# Patient Record
Sex: FEMALE | URBAN - METROPOLITAN AREA
[De-identification: names, ages, dates, MRNs, and addresses within clinical notes are randomized per-mention and may not be internally consistent; named-entity substitution may affect disease eponyms.]

---

## 2019-01-07 ENCOUNTER — HOSPITAL ENCOUNTER (OUTPATIENT)
Dept: LAB | Age: 49
Discharge: HOME | End: 2019-01-07

## 2019-01-07 PROCEDURE — 88305 TISSUE EXAM BY PATHOLOGIST: CPT

## 2019-01-07 PROCEDURE — 88312 SPECIAL STAINS GROUP 1: CPT

## 2023-08-28 PROBLEM — Z87.42 HISTORY OF PCOS: Status: ACTIVE | Noted: 2023-08-28

## 2023-08-28 PROBLEM — I25.42 SPONTANEOUS DISSECTION OF CORONARY ARTERY: Status: ACTIVE | Noted: 2023-08-28

## 2023-08-28 PROBLEM — M35.9 CONNECTIVE TISSUE DISORDER (MULTI): Status: ACTIVE | Noted: 2023-08-28

## 2023-08-28 PROBLEM — G43.909 MIGRAINES: Status: ACTIVE | Noted: 2023-08-28

## 2023-08-28 PROBLEM — E03.9 HYPOTHYROIDISM: Status: ACTIVE | Noted: 2023-08-28

## 2023-08-28 RX ORDER — CARVEDILOL 3.12 MG/1
1 TABLET ORAL 2 TIMES DAILY
COMMUNITY
Start: 2018-06-25 | End: 2024-04-04 | Stop reason: SDUPTHER

## 2023-08-28 RX ORDER — CYCLOBENZAPRINE HCL 5 MG
TABLET ORAL
COMMUNITY

## 2023-08-28 RX ORDER — HYDROXYZINE HYDROCHLORIDE 10 MG/1
TABLET, FILM COATED ORAL
COMMUNITY

## 2023-08-28 RX ORDER — ASPIRIN 81 MG/1
1 TABLET ORAL EVERY OTHER DAY
COMMUNITY
Start: 2019-06-11

## 2023-08-28 RX ORDER — LORATADINE 10 MG/1
1 CAPSULE, LIQUID FILLED ORAL 2 TIMES DAILY PRN
COMMUNITY
Start: 2019-06-11

## 2023-08-28 RX ORDER — BUTALBITAL, ACETAMINOPHEN, CAFFEINE AND CODEINE PHOSPHATE 300; 50; 40; 30 MG/1; MG/1; MG/1; MG/1
1 CAPSULE ORAL EVERY 6 HOURS PRN
COMMUNITY
Start: 2019-06-11

## 2023-08-28 RX ORDER — ALUMINUM ZIRCONIUM OCTACHLOROHYDREX GLY 16 G/100G
1 GEL TOPICAL EVERY MORNING
COMMUNITY
Start: 2019-06-11

## 2023-08-28 RX ORDER — THYROID 90 MG/1
1 TABLET ORAL
COMMUNITY
Start: 2021-09-20

## 2024-04-04 ENCOUNTER — HOSPITAL ENCOUNTER (OUTPATIENT)
Dept: CARDIOLOGY | Facility: HOSPITAL | Age: 54
Discharge: HOME | End: 2024-04-04
Payer: COMMERCIAL

## 2024-04-04 ENCOUNTER — LAB (OUTPATIENT)
Dept: LAB | Facility: LAB | Age: 54
End: 2024-04-04
Payer: COMMERCIAL

## 2024-04-04 ENCOUNTER — OFFICE VISIT (OUTPATIENT)
Dept: CARDIOLOGY | Facility: HOSPITAL | Age: 54
End: 2024-04-04
Payer: COMMERCIAL

## 2024-04-04 VITALS
WEIGHT: 220.6 LBS | SYSTOLIC BLOOD PRESSURE: 141 MMHG | BODY MASS INDEX: 34.62 KG/M2 | DIASTOLIC BLOOD PRESSURE: 88 MMHG | HEIGHT: 67 IN | HEART RATE: 85 BPM | OXYGEN SATURATION: 96 %

## 2024-04-04 DIAGNOSIS — Z00.00 HEALTHCARE MAINTENANCE: ICD-10-CM

## 2024-04-04 DIAGNOSIS — I10 PRIMARY HYPERTENSION: ICD-10-CM

## 2024-04-04 DIAGNOSIS — I25.42 SPONTANEOUS DISSECTION OF CORONARY ARTERY: ICD-10-CM

## 2024-04-04 DIAGNOSIS — E66.9 OBESITY (BMI 30.0-34.9): Primary | ICD-10-CM

## 2024-04-04 PROBLEM — R87.619 ABNORMAL CERVICAL CYTOLOGY: Status: ACTIVE | Noted: 2024-04-04

## 2024-04-04 PROBLEM — I51.89 DIASTOLIC DYSFUNCTION: Status: ACTIVE | Noted: 2021-01-27

## 2024-04-04 PROBLEM — K90.41 GLUTEN INTOLERANCE: Status: ACTIVE | Noted: 2024-04-04

## 2024-04-04 PROBLEM — Z98.890 HISTORY OF REPAIR OF ACL: Status: ACTIVE | Noted: 2023-12-11

## 2024-04-04 PROBLEM — E78.5 HYPERLIPIDEMIA: Status: ACTIVE | Noted: 2017-02-20

## 2024-04-04 PROBLEM — M62.81 MUSCLE WEAKNESS (GENERALIZED): Status: ACTIVE | Noted: 2024-04-04

## 2024-04-04 PROBLEM — Q89.1 ADRENAL GLAND ANOMALY: Status: ACTIVE | Noted: 2024-04-04

## 2024-04-04 PROBLEM — M25.562 ACUTE PAIN OF LEFT KNEE: Status: ACTIVE | Noted: 2023-06-15

## 2024-04-04 PROBLEM — I34.0 MITRAL VALVE REGURGITATION: Status: ACTIVE | Noted: 2021-01-27

## 2024-04-04 PROBLEM — I21.9 ACUTE MI (MULTI): Status: ACTIVE | Noted: 2017-02-20

## 2024-04-04 PROBLEM — E28.2 PCOS (POLYCYSTIC OVARIAN SYNDROME): Status: ACTIVE | Noted: 2021-01-27

## 2024-04-04 PROBLEM — M25.572 ACUTE LEFT ANKLE PAIN: Status: ACTIVE | Noted: 2023-06-15

## 2024-04-04 PROBLEM — M25.662 DECREASED RANGE OF MOTION OF LEFT KNEE: Status: ACTIVE | Noted: 2024-04-04

## 2024-04-04 PROBLEM — B35.4 TINEA CORPORIS: Status: ACTIVE | Noted: 2017-06-28

## 2024-04-04 PROBLEM — S83.512A LEFT ANTERIOR CRUCIATE LIGAMENT TEAR: Status: ACTIVE | Noted: 2023-10-31

## 2024-04-04 LAB
ALBUMIN SERPL BCP-MCNC: 4.2 G/DL (ref 3.4–5)
ALP SERPL-CCNC: 64 U/L (ref 33–110)
ALT SERPL W P-5'-P-CCNC: 30 U/L (ref 7–45)
ANION GAP SERPL CALC-SCNC: 11 MMOL/L (ref 10–20)
AST SERPL W P-5'-P-CCNC: 17 U/L (ref 9–39)
BILIRUB SERPL-MCNC: 0.4 MG/DL (ref 0–1.2)
BUN SERPL-MCNC: 25 MG/DL (ref 6–23)
CALCIUM SERPL-MCNC: 9.3 MG/DL (ref 8.6–10.6)
CHLORIDE SERPL-SCNC: 106 MMOL/L (ref 98–107)
CO2 SERPL-SCNC: 24 MMOL/L (ref 21–32)
CREAT SERPL-MCNC: 0.74 MG/DL (ref 0.5–1.05)
EGFRCR SERPLBLD CKD-EPI 2021: >90 ML/MIN/1.73M*2
ERYTHROCYTE [DISTWIDTH] IN BLOOD BY AUTOMATED COUNT: 12.4 % (ref 11.5–14.5)
EST. AVERAGE GLUCOSE BLD GHB EST-MCNC: 114 MG/DL
GLUCOSE SERPL-MCNC: 115 MG/DL (ref 74–99)
HBA1C MFR BLD: 5.6 %
HCT VFR BLD AUTO: 42.1 % (ref 36–46)
HGB BLD-MCNC: 13.9 G/DL (ref 12–16)
MCH RBC QN AUTO: 29.1 PG (ref 26–34)
MCHC RBC AUTO-ENTMCNC: 33 G/DL (ref 32–36)
MCV RBC AUTO: 88 FL (ref 80–100)
NRBC BLD-RTO: 0 /100 WBCS (ref 0–0)
PLATELET # BLD AUTO: 280 X10*3/UL (ref 150–450)
POTASSIUM SERPL-SCNC: 4.4 MMOL/L (ref 3.5–5.3)
PROT SERPL-MCNC: 6.7 G/DL (ref 6.4–8.2)
RBC # BLD AUTO: 4.78 X10*6/UL (ref 4–5.2)
SODIUM SERPL-SCNC: 137 MMOL/L (ref 136–145)
WBC # BLD AUTO: 5 X10*3/UL (ref 4.4–11.3)

## 2024-04-04 PROCEDURE — 83036 HEMOGLOBIN GLYCOSYLATED A1C: CPT

## 2024-04-04 PROCEDURE — 36415 COLL VENOUS BLD VENIPUNCTURE: CPT

## 2024-04-04 PROCEDURE — 99214 OFFICE O/P EST MOD 30 MIN: CPT | Performed by: INTERNAL MEDICINE

## 2024-04-04 PROCEDURE — 3079F DIAST BP 80-89 MM HG: CPT | Performed by: INTERNAL MEDICINE

## 2024-04-04 PROCEDURE — 1036F TOBACCO NON-USER: CPT | Performed by: INTERNAL MEDICINE

## 2024-04-04 PROCEDURE — 93005 ELECTROCARDIOGRAM TRACING: CPT

## 2024-04-04 PROCEDURE — 85027 COMPLETE CBC AUTOMATED: CPT

## 2024-04-04 PROCEDURE — 80053 COMPREHEN METABOLIC PANEL: CPT

## 2024-04-04 PROCEDURE — 3077F SYST BP >= 140 MM HG: CPT | Performed by: INTERNAL MEDICINE

## 2024-04-04 PROCEDURE — 93010 ELECTROCARDIOGRAM REPORT: CPT | Performed by: INTERNAL MEDICINE

## 2024-04-04 RX ORDER — CARVEDILOL 6.25 MG/1
6.25 TABLET ORAL 2 TIMES DAILY
Qty: 180 TABLET | Refills: 3 | Status: SHIPPED | OUTPATIENT
Start: 2024-04-04 | End: 2025-04-04

## 2024-04-04 ASSESSMENT — PAIN SCALES - GENERAL: PAINLEVEL: 0-NO PAIN

## 2024-04-04 NOTE — PATIENT INSTRUCTIONS
Increase Carvedilol to 6.25mg twice daily.  Ordered blood work for you today.    See you back in 1 year.    Beverly Mcgrath MD  Co-Director, Vascular Center  [facility] [place], [facility]   [facility] Family Master Clinician in Fibromuscular Dysplasia and Vascular Care  Professor of Medicine  [facility]

## 2024-04-04 NOTE — LETTER
April 4, 2024     Mt Rosado Jr., MD  [address] [facility], [address] [place] [address]    Patient: Bambi Garner   YOB: 1970   Date of Visit: 4/4/2024       Dear Dr. Mt Rosado Jr., MD:    Thank you for referring Bambi Garner to me for evaluation. Below are my notes for this consultation.  If you have questions, please do not hesitate to call me. I look forward to following your patient along with you.       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 She underwent pan vascular testing and be ruled out fibromuscular dysplasia or other extra coronary vascular abnormalities. 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      She has not had cardiac events since I saw her last year. She had knee surgery in November (left ACL reconstruction); it's been a slow recovery.    She has regained ~ 12# since I saw her last year.    No chest pain, dyspnea, palpitations; some high BP readings including around time of surgery.    Patient Active Problem List   Diagnosis   • Connective tissue disorder (CMS/HCC)   • Hypothyroidism   • Migraines   • Spontaneous dissection of coronary artery   • History of PCOS   • Abnormal cervical cytology   • Acute left ankle pain   • Acute MI (CMS/HCC)   • Acute pain of left knee   • Adrenal gland anomaly   • Cholelithiasis   • Decreased range of motion of left knee   • Depression   • Diastolic dysfunction   • Gastroesophageal reflux disease   • Gluten intolerance   • History of repair of ACL   •  "Hyperlipidemia   • Hypertension   • Left anterior cruciate ligament tear   • Mitral valve regurgitation   • Muscle weakness (generalized)   • PCOS (polycystic ovarian syndrome)   • Tinea corporis     Current Outpatient Medications   Medication Sig Dispense Refill   • aspirin 81 mg EC tablet Take 1 tablet (81 mg) by mouth every other day.     • Bifidobacterium infantis (Align) 4 mg capsule Take 1 capsule (4 mg) by mouth once daily in the morning.     • butalbitaL-acetaminop-caf-cod (Fioricet with Codeine) -10-30 mg capsule Take 1 capsule by mouth every 6 hours if needed.     • carvedilol (Coreg) 6.25 mg tablet Take 1 tablet (6.25 mg) by mouth 2 times a day. 180 tablet 3   • cyclobenzaprine (Flexeril) 5 mg tablet Take by mouth.     • fish oil concentrate (Omega-3) 120-180 mg capsule Take by mouth.     • hydrOXYzine HCL (Atarax) 10 mg tablet Take by mouth.     • loratadine (Claritin Liqui-Gel) 10 mg capsule Take 1 tablet by mouth 2 times a day as needed.     • multivitamin with folic acid (THERA ORAL) Take by mouth.     • thyroid, pork, ([place] Thyroid) 90 mg tablet Take 1 tablet (90 mg) by mouth once daily in the morning. Take before meals. On empty stomach       No current facility-administered medications for this visit.     On examination:  Patient Vitals for the past 24 hrs:   BP Pulse SpO2 Height Weight   04/04/24 1112 141/88 85 96 % 1.702 m (5' 7\") 100 kg (220 lb 9.6 oz)    Weight is up 12# since last year and ~35# since 2017  HEENT no Chato's  JVP flat  +S1, S2, RRR; no m/r/g  Clear lungs  Abd soft, benign  Brisk LE pulses    ECG today NSR at 83 beats per minute. Normal intervals/axis, slightly low voltage QRS    10.2022 head to pelvis CTA  ? Very slight ICA Beading, but I really thought likely normal  Some aortic tortuosity  No aortic, visceral branch, or IC aneurysms  No renal/mesenteric/iliac FMD, ? Slight ectasia reported but I was not compelled    Results/Data  I am not impressed by ? of ICA " beading, splenic/right renal ectasias, likely variants of normal. Multiple thyroid nodules.      Assessment/Plan:     Terrific 54-year-old woman who had a spontaneous coronary artery dissection in 2017 involving an OM branch. She is clinically doing well with no recurrent events. 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     From SCAD perspective, she is doing fine, no recurrent events in ~7 years. She does need sustained B Blockade to prevent recurrent SCAD events. BP a little high here today and some high readings at home. I will escalate carvedilol to 6.25 mg BID.  She will continue low dose aspirin.     Prior head to pelvis imaging with very subtle, ? variant of normal bilateral ICA, splenic and right renal abnormalities (later ? very minor ectasias). Again, I still think may just be variant of normal, but will plan repeat brain to pelvis imaging in ~ 2027.     We discussed her weight gain, I also see some blood sugar readings on metabolic panels ~ 115 even before she gained weight. She was on metformin in past for PCOS. I will check labs today including HgB A1c... I am worried she has type II diabetes now.  Discussed weight loss strategies as a first step.  Her BMI is now in the ~ 34.5 range c/w obesity.    RTC 1 year with ECG; interval follow-up on labs.    Beverly Mcgrath MD  Co-Director, Vascular Center  [facility] [place], [facility]   [facility] Family Master Clinician in Fibromuscular Dysplasia and Vascular Care  Professor of Medicine  [facility] [place]

## 2024-04-04 NOTE — PROGRESS NOTES
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 She underwent pan vascular testing and be ruled out fibromuscular dysplasia or other extra coronary vascular abnormalities. 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      She has not had cardiac events since I saw her last year. She had knee surgery in November (left ACL reconstruction); it's been a slow recovery.    She has regained ~ 12# since I saw her last year.    No chest pain, dyspnea, palpitations; some high BP readings including around time of surgery.    Patient Active Problem List   Diagnosis    Connective tissue disorder (CMS/HCC)    Hypothyroidism    Migraines    Spontaneous dissection of coronary artery    History of PCOS    Abnormal cervical cytology    Acute left ankle pain    Acute MI (CMS/HCC)    Acute pain of left knee    Adrenal gland anomaly    Cholelithiasis    Decreased range of motion of left knee    Depression    Diastolic dysfunction    Gastroesophageal reflux disease    Gluten intolerance    History of repair of ACL    Hyperlipidemia    Hypertension    Left anterior cruciate ligament tear    Mitral valve regurgitation    Muscle weakness (generalized)    PCOS (polycystic ovarian syndrome)    Tinea corporis     Current Outpatient Medications   Medication Sig Dispense Refill    aspirin 81 mg EC tablet Take 1 tablet (81 mg) by mouth every other day.      Bifidobacterium infantis (Align) 4 mg capsule Take 1 capsule (4 mg) by mouth once daily in the morning.      butalbitaL-acetaminop-caf-cod (Fioricet with Codeine) -32-30 mg capsule Take 1 capsule by mouth every 6 hours if needed.      carvedilol (Coreg) 6.25 mg tablet Take 1 tablet  "(6.25 mg) by mouth 2 times a day. 180 tablet 3    cyclobenzaprine (Flexeril) 5 mg tablet Take by mouth.      fish oil concentrate (Omega-3) 120-180 mg capsule Take by mouth.      hydrOXYzine HCL (Atarax) 10 mg tablet Take by mouth.      loratadine (Claritin Liqui-Gel) 10 mg capsule Take 1 tablet by mouth 2 times a day as needed.      multivitamin with folic acid (THERA ORAL) Take by mouth.      thyroid, pork, ([place] Thyroid) 90 mg tablet Take 1 tablet (90 mg) by mouth once daily in the morning. Take before meals. On empty stomach       No current facility-administered medications for this visit.     On examination:  Patient Vitals for the past 24 hrs:   BP Pulse SpO2 Height Weight   04/04/24 1112 141/88 85 96 % 1.702 m (5' 7\") 100 kg (220 lb 9.6 oz)    Weight is up 12# since last year and ~35# since 2017  HEENT no Chato's  JVP flat  +S1, S2, RRR; no m/r/g  Clear lungs  Abd soft, benign  Brisk LE pulses    ECG today NSR at 83 beats per minute. Normal intervals/axis, slightly low voltage QRS    10.2022 head to pelvis CTA  ? Very slight ICA Beading, but I really thought likely normal  Some aortic tortuosity  No aortic, visceral branch, or IC aneurysms  No renal/mesenteric/iliac FMD, ? Slight ectasia reported but I was not compelled    Results/Data  I am not impressed by ? of ICA beading, splenic/right renal ectasias, likely variants of normal. Multiple thyroid nodules.      Assessment/Plan:     Terrific 54-year-old woman who had a spontaneous coronary artery dissection in 2017 involving an OM branch. She is clinically doing well with no recurrent events. 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     From SCAD perspective, she is doing fine, no recurrent events in ~7 years. She does need sustained B Blockade to prevent recurrent SCAD events. BP a little high here today and some high readings at home. I will escalate carvedilol to 6.25 mg BID.  She will " continue low dose aspirin.     Prior head to pelvis imaging with very subtle, ? variant of normal bilateral ICA, splenic and right renal abnormalities (later ? very minor ectasias). Again, I still think may just be variant of normal, but will plan repeat brain to pelvis imaging in ~ 2027.     We discussed her weight gain, I also see some blood sugar readings on metabolic panels ~ 115 even before she gained weight. She was on metformin in past for PCOS. I will check labs today including HgB A1c... I am worried she has type II diabetes now.  Discussed weight loss strategies as a first step.  Her BMI is now in the ~ 34.5 range c/w obesity.    RTC 1 year with ECG; interval follow-up on labs.    Beverly Mcgrath MD  Co-Director, Vascular Center  [facility] [place], [facility]   [facility] Luna Family Master Clinician in Fibromuscular Dysplasia and Vascular Care  Professor of Medicine  [facility]

## 2024-04-08 ENCOUNTER — TELEPHONE (OUTPATIENT)
Dept: CARDIOLOGY | Facility: HOSPITAL | Age: 54
End: 2024-04-08
Payer: COMMERCIAL

## 2024-04-08 LAB
ATRIAL RATE: 83 BPM
P AXIS: -20 DEGREES
P OFFSET: 190 MS
P ONSET: 142 MS
PR INTERVAL: 152 MS
Q ONSET: 218 MS
QRS COUNT: 13 BEATS
QRS DURATION: 82 MS
QT INTERVAL: 352 MS
QTC CALCULATION(BAZETT): 413 MS
QTC FREDERICIA: 392 MS
R AXIS: 67 DEGREES
T AXIS: 59 DEGREES
T OFFSET: 394 MS
VENTRICULAR RATE: 83 BPM

## 2024-04-08 NOTE — TELEPHONE ENCOUNTER
VM left for pt with lab results per Dr. Mcgrath.  Asked pt to call us with any questions or concerns.   ----- Message from Beverly Mcgrath MD sent at 4/8/2024  8:47 AM EDT -----  Patient does not have my chart. Blood sugar again borderline for diabetes. Please really need to work on weight loss. It was a pleasure to see her last week.

## 2024-04-08 NOTE — RESULT ENCOUNTER NOTE
Patient does not have my chart. Blood sugar again borderline for diabetes. Please really need to work on weight loss. It was a pleasure to see her last week... maybe see her later this month?

## 2025-05-01 ENCOUNTER — APPOINTMENT (OUTPATIENT)
Dept: CARDIOLOGY | Facility: HOSPITAL | Age: 55
End: 2025-05-01
Payer: COMMERCIAL